# Patient Record
Sex: MALE | Race: BLACK OR AFRICAN AMERICAN | NOT HISPANIC OR LATINO | Employment: FULL TIME | URBAN - METROPOLITAN AREA
[De-identification: names, ages, dates, MRNs, and addresses within clinical notes are randomized per-mention and may not be internally consistent; named-entity substitution may affect disease eponyms.]

---

## 2023-11-16 ENCOUNTER — TELEPHONE (OUTPATIENT)
Dept: PRIMARY CARE | Facility: CLINIC | Age: 58
End: 2023-11-16
Payer: COMMERCIAL

## 2023-11-16 NOTE — TELEPHONE ENCOUNTER
DR. Null, Pt. Left a voicemail message saying need a referral from you it's very important call him back at [phone].

## 2023-11-17 ENCOUNTER — OFFICE VISIT (OUTPATIENT)
Dept: PRIMARY CARE | Facility: CLINIC | Age: 58
End: 2023-11-17
Payer: COMMERCIAL

## 2023-11-17 VITALS
SYSTOLIC BLOOD PRESSURE: 120 MMHG | WEIGHT: 166 LBS | HEART RATE: 81 BPM | DIASTOLIC BLOOD PRESSURE: 70 MMHG | TEMPERATURE: 97.8 F | BODY MASS INDEX: 25.24 KG/M2

## 2023-11-17 DIAGNOSIS — L03.011 INFECTION OF NAIL BED OF FINGER OF RIGHT HAND: ICD-10-CM

## 2023-11-17 DIAGNOSIS — B35.3 TINEA PEDIS OF BOTH FEET: ICD-10-CM

## 2023-11-17 DIAGNOSIS — H35.9 RETINA DISORDER, RIGHT: ICD-10-CM

## 2023-11-17 DIAGNOSIS — Z12.11 COLON CANCER SCREENING: ICD-10-CM

## 2023-11-17 DIAGNOSIS — E61.1 IRON DEFICIENCY: ICD-10-CM

## 2023-11-17 DIAGNOSIS — D75.89 MACROCYTOSIS: ICD-10-CM

## 2023-11-17 DIAGNOSIS — B35.1 ONYCHOMYCOSIS: Primary | ICD-10-CM

## 2023-11-17 DIAGNOSIS — E11.9 TYPE 2 DIABETES MELLITUS WITHOUT COMPLICATION, WITHOUT LONG-TERM CURRENT USE OF INSULIN (MULTI): ICD-10-CM

## 2023-11-17 DIAGNOSIS — E55.9 VITAMIN D DEFICIENCY: ICD-10-CM

## 2023-11-17 DIAGNOSIS — E78.00 ELEVATED LDL CHOLESTEROL LEVEL: ICD-10-CM

## 2023-11-17 PROBLEM — H25.13 AGE-RELATED NUCLEAR CATARACT, BILATERAL: Status: ACTIVE | Noted: 2023-11-17

## 2023-11-17 PROBLEM — K64.4 EXTERNAL HEMORRHOIDS: Status: ACTIVE | Noted: 2023-11-17

## 2023-11-17 PROBLEM — A60.00 HERPES GENITALIA: Status: ACTIVE | Noted: 2023-11-17

## 2023-11-17 PROBLEM — B97.7 HPV IN MALE: Status: ACTIVE | Noted: 2023-11-17

## 2023-11-17 PROBLEM — A63.0 CONDYLOMA ACUMINATUM: Status: ACTIVE | Noted: 2023-11-17

## 2023-11-17 PROBLEM — I25.10 CORONARY ARTERY DISEASE: Status: ACTIVE | Noted: 2023-11-17

## 2023-11-17 PROBLEM — H61.23 BILATERAL IMPACTED CERUMEN: Status: ACTIVE | Noted: 2019-11-02

## 2023-11-17 PROBLEM — L60.1 ONYCHOLYSIS OF TOENAIL: Status: ACTIVE | Noted: 2023-11-17

## 2023-11-17 PROBLEM — H60.549 ECZEMA OF EXTERNAL AUDITORY CANAL: Status: ACTIVE | Noted: 2019-11-02

## 2023-11-17 PROCEDURE — 3074F SYST BP LT 130 MM HG: CPT | Performed by: PEDIATRICS

## 2023-11-17 PROCEDURE — 99214 OFFICE O/P EST MOD 30 MIN: CPT | Performed by: PEDIATRICS

## 2023-11-17 PROCEDURE — 87070 CULTURE OTHR SPECIMN AEROBIC: CPT

## 2023-11-17 PROCEDURE — 87181 SC STD AGAR DILUTION PER AGT: CPT

## 2023-11-17 PROCEDURE — 87186 SC STD MICRODIL/AGAR DIL: CPT

## 2023-11-17 PROCEDURE — 3078F DIAST BP <80 MM HG: CPT | Performed by: PEDIATRICS

## 2023-11-17 PROCEDURE — 90471 IMMUNIZATION ADMIN: CPT | Performed by: PEDIATRICS

## 2023-11-17 PROCEDURE — 90686 IIV4 VACC NO PRSV 0.5 ML IM: CPT | Performed by: PEDIATRICS

## 2023-11-17 PROCEDURE — 87102 FUNGUS ISOLATION CULTURE: CPT

## 2023-11-17 PROCEDURE — 87075 CULTR BACTERIA EXCEPT BLOOD: CPT

## 2023-11-17 RX ORDER — GLUCOSAMINE/CHONDR SU A SOD 750-600 MG
1 TABLET ORAL DAILY
COMMUNITY
Start: 2021-08-04

## 2023-11-17 RX ORDER — ROSUVASTATIN CALCIUM 5 MG/1
5 TABLET, COATED ORAL DAILY
COMMUNITY
End: 2024-03-12 | Stop reason: ALTCHOICE

## 2023-11-17 RX ORDER — ACETAMINOPHEN 500 MG
2000 TABLET ORAL DAILY
COMMUNITY
Start: 2021-08-04

## 2023-11-17 NOTE — PROGRESS NOTES
Subjective   Patient ID: Sergio Hancock is a 58 y.o. male who presents for nail issue    HPI   Fungal infection right thumb and right middle finger. Failed Terbinafine previously April 2022.   Developed green discoloration under right thumb and middle finger; nail is starting to peel off the nailbed.  Needs cologuard  No blood work since Dec 2022    Review of Systems    Objective   /70   Pulse 81   Temp 36.6 °C (97.8 °F)   Wt 75.3 kg (166 lb)   BMI 25.24 kg/m²     Physical Exam  Lungs clear  Heart RRR  Right middle fingernail and thumbnail are discolored and lifting off nailbed  T pedis note  Assessment/Plan   Problem List Items Addressed This Visit             ICD-10-CM    Onychomycosis - Primary B35.1     Failed Terbinafine         Relevant Orders    Referral to Dermatology    Fungus Culture, Hair/Skin/Nails    Diabetes mellitus, type 2 (CMS/HCC) E11.9    Relevant Orders    Referral to Ophthalmology    Hemoglobin A1C    Elevated LDL cholesterol level E78.00    Relevant Orders    Comprehensive Metabolic Panel    Lipid panel    Iron deficiency E61.1    Relevant Orders    Iron and TIBC    Macrocytosis D75.89    Relevant Orders    CBC    Vitamin B12    Folate    Vitamin D deficiency E55.9    Retina disorder, 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

## 2023-11-18 PROBLEM — B35.3 TINEA PEDIS OF BOTH FEET: Status: ACTIVE | Noted: 2023-11-18

## 2023-11-22 LAB
BACTERIA SPEC CULT: ABNORMAL
GRAM STN SPEC: ABNORMAL
GRAM STN SPEC: ABNORMAL

## 2023-11-27 ENCOUNTER — APPOINTMENT (OUTPATIENT)
Dept: DERMATOLOGY | Facility: CLINIC | Age: 58
End: 2023-11-27
Payer: COMMERCIAL

## 2023-11-28 DIAGNOSIS — L60.8 GREEN NAILS: Primary | ICD-10-CM

## 2023-12-07 ENCOUNTER — LAB (OUTPATIENT)
Dept: LAB | Facility: LAB | Age: 58
End: 2023-12-07
Payer: COMMERCIAL

## 2023-12-07 ENCOUNTER — OFFICE VISIT (OUTPATIENT)
Dept: DERMATOLOGY | Facility: CLINIC | Age: 58
End: 2023-12-07
Payer: COMMERCIAL

## 2023-12-07 DIAGNOSIS — E78.00 ELEVATED LDL CHOLESTEROL LEVEL: ICD-10-CM

## 2023-12-07 DIAGNOSIS — E11.9 TYPE 2 DIABETES MELLITUS WITHOUT COMPLICATION, WITHOUT LONG-TERM CURRENT USE OF INSULIN (MULTI): ICD-10-CM

## 2023-12-07 DIAGNOSIS — L60.3 NAIL DYSTROPHY: Primary | ICD-10-CM

## 2023-12-07 DIAGNOSIS — L81.9 HYPERPIGMENTATION OF SKIN: ICD-10-CM

## 2023-12-07 DIAGNOSIS — E61.1 IRON DEFICIENCY: ICD-10-CM

## 2023-12-07 DIAGNOSIS — D75.89 MACROCYTOSIS: ICD-10-CM

## 2023-12-07 LAB
EST. AVERAGE GLUCOSE BLD GHB EST-MCNC: 160 MG/DL
HBA1C MFR BLD: 7.2 %

## 2023-12-07 PROCEDURE — 82746 ASSAY OF FOLIC ACID SERUM: CPT

## 2023-12-07 PROCEDURE — 80061 LIPID PANEL: CPT

## 2023-12-07 PROCEDURE — 82607 VITAMIN B-12: CPT

## 2023-12-07 PROCEDURE — 83550 IRON BINDING TEST: CPT

## 2023-12-07 PROCEDURE — 99203 OFFICE O/P NEW LOW 30 MIN: CPT | Performed by: DERMATOLOGY

## 2023-12-07 PROCEDURE — 88305 TISSUE EXAM BY PATHOLOGIST: CPT | Performed by: DERMATOLOGY

## 2023-12-07 PROCEDURE — 85027 COMPLETE CBC AUTOMATED: CPT

## 2023-12-07 PROCEDURE — 36415 COLL VENOUS BLD VENIPUNCTURE: CPT

## 2023-12-07 PROCEDURE — 80053 COMPREHEN METABOLIC PANEL: CPT

## 2023-12-07 PROCEDURE — 83036 HEMOGLOBIN GLYCOSYLATED A1C: CPT

## 2023-12-07 PROCEDURE — 83540 ASSAY OF IRON: CPT

## 2023-12-07 PROCEDURE — 88312 SPECIAL STAINS GROUP 1: CPT | Performed by: DERMATOLOGY

## 2023-12-07 PROCEDURE — 88312 SPECIAL STAINS GROUP 1: CPT | Mod: TC,DER | Performed by: DERMATOLOGY

## 2023-12-07 NOTE — PROGRESS NOTES
Ehsan Hancock is a 58 y.o. male who presents for the following: Nail Problem (Right thumb, middle finger, left big toe ). It was green.  His hands are not wet a lot. Took lamisil Nov 2021.    Review of Systems:  No other skin or systemic complaints other than what is documented elsewhere in the note.    The following portions of the chart were reviewed this encounter and updated as appropriate:          Skin Cancer History  No skin cancer on file.      Specialty Problems          Dermatology Problems    Onycholysis of toenail    Onychomycosis    Tinea pedis of both feet    Green nails        Objective   Well appearing patient in no apparent distress; mood and affect are within normal limits.    A focused skin examination was performed. All findings within normal limits unless otherwise noted below.    Assessment/Plan   1. Nail dystrophy (3)  Left Hallux Toe Nail Plate, Right 3rd Finger Nail Plate, Right Thumb Nail Plate  Onycholysis of all nails most of thumb.  Clipping sent for PAS/H&E.  If fungal discussed oral versus topical (he was treated with oral).  Culture showed pseudomonas. If fungal culture negative, discussed onycholysis treatment with clipping nail as far back as he can and then will prescribe topical 15% sodium sulfacetamide in 50% ethanol that has to be compounded (discussed Everardo Barber).      Skin biopsy - Right Thumb Nail Plate    Informed consent: discussed and consent obtained    Timeout: patient name, date of birth, surgical site, and procedure verified      Specimen 1 - Dermatopathology- DERM LAB  Differential Diagnosis: Onychomycosis  Check Margins Yes/No?:    Comments:    Dermpath Lab: Routine Histopathology (formalin-fixed tissue)    2. Hyperpigmentation of skin  Right Anterior Neck  Mild oval hyperpigmented patch right neck.  Could be postinflammatory hyperpigmentation.        2. Darkening after a rash might be made better with a bleaching cream.  The over the counter products  are lower strength than prescription, but less expensive. Do not use more than a month as you can get darkening of the skin that is irreversible.  Ambi fade cream is an example.

## 2023-12-08 LAB
ALBUMIN SERPL BCP-MCNC: 4.6 G/DL (ref 3.4–5)
ALP SERPL-CCNC: 76 U/L (ref 33–120)
ALT SERPL W P-5'-P-CCNC: 25 U/L (ref 10–52)
ANION GAP SERPL CALC-SCNC: 15 MMOL/L (ref 10–20)
AST SERPL W P-5'-P-CCNC: 17 U/L (ref 9–39)
BILIRUB SERPL-MCNC: 0.8 MG/DL (ref 0–1.2)
BUN SERPL-MCNC: 17 MG/DL (ref 6–23)
CALCIUM SERPL-MCNC: 9.5 MG/DL (ref 8.6–10.6)
CHLORIDE SERPL-SCNC: 102 MMOL/L (ref 98–107)
CHOLEST SERPL-MCNC: 149 MG/DL (ref 0–199)
CHOLESTEROL/HDL RATIO: 2.7
CO2 SERPL-SCNC: 27 MMOL/L (ref 21–32)
CREAT SERPL-MCNC: 1.02 MG/DL (ref 0.5–1.3)
ERYTHROCYTE [DISTWIDTH] IN BLOOD BY AUTOMATED COUNT: 11 % (ref 11.5–14.5)
FOLATE SERPL-MCNC: 23.7 NG/ML
GFR SERPL CREATININE-BSD FRML MDRD: 85 ML/MIN/1.73M*2
GLUCOSE SERPL-MCNC: 180 MG/DL (ref 74–99)
HCT VFR BLD AUTO: 48.4 % (ref 41–52)
HDLC SERPL-MCNC: 54.9 MG/DL
HGB BLD-MCNC: 16.4 G/DL (ref 13.5–17.5)
IRON SATN MFR SERPL: 39 % (ref 25–45)
IRON SERPL-MCNC: 135 UG/DL (ref 35–150)
LDLC SERPL CALC-MCNC: 80 MG/DL
MCH RBC QN AUTO: 33.9 PG (ref 26–34)
MCHC RBC AUTO-ENTMCNC: 33.9 G/DL (ref 32–36)
MCV RBC AUTO: 100 FL (ref 80–100)
NON HDL CHOLESTEROL: 94 MG/DL (ref 0–149)
NRBC BLD-RTO: 0 /100 WBCS (ref 0–0)
PLATELET # BLD AUTO: 223 X10*3/UL (ref 150–450)
POTASSIUM SERPL-SCNC: 5.8 MMOL/L (ref 3.5–5.3)
PROT SERPL-MCNC: 7.7 G/DL (ref 6.4–8.2)
RBC # BLD AUTO: 4.84 X10*6/UL (ref 4.5–5.9)
SODIUM SERPL-SCNC: 138 MMOL/L (ref 136–145)
TIBC SERPL-MCNC: 344 UG/DL (ref 240–445)
TRIGL SERPL-MCNC: 69 MG/DL (ref 0–149)
UIBC SERPL-MCNC: 209 UG/DL (ref 110–370)
VIT B12 SERPL-MCNC: 605 PG/ML (ref 211–911)
VLDL: 14 MG/DL (ref 0–40)
WBC # BLD AUTO: 8.4 X10*3/UL (ref 4.4–11.3)

## 2023-12-09 DIAGNOSIS — E87.5 HYPERKALEMIA: Primary | ICD-10-CM

## 2023-12-09 DIAGNOSIS — E11.9 TYPE 2 DIABETES MELLITUS WITHOUT COMPLICATION, WITHOUT LONG-TERM CURRENT USE OF INSULIN (MULTI): ICD-10-CM

## 2023-12-09 RX ORDER — METFORMIN HYDROCHLORIDE 500 MG/1
500 TABLET, EXTENDED RELEASE ORAL
Qty: 30 TABLET | Refills: 11 | Status: SHIPPED | OUTPATIENT
Start: 2023-12-09 | End: 2024-03-12 | Stop reason: ALTCHOICE

## 2023-12-09 RX ORDER — BLOOD-GLUCOSE SENSOR
EACH MISCELLANEOUS
Qty: 6 EACH | Refills: 0 | Status: SHIPPED | OUTPATIENT
Start: 2023-12-09 | End: 2024-03-12 | Stop reason: ALTCHOICE

## 2023-12-11 LAB — FUNGUS SPEC CULT: NORMAL

## 2023-12-12 ENCOUNTER — TELEPHONE (OUTPATIENT)
Dept: LAB | Facility: HOSPITAL | Age: 58
End: 2023-12-12

## 2023-12-12 LAB
LABORATORY COMMENT REPORT: NORMAL
PATH REPORT.FINAL DX SPEC: NORMAL
PATH REPORT.GROSS SPEC: NORMAL
PATH REPORT.MICROSCOPIC SPEC OTHER STN: NORMAL
PATH REPORT.RELEVANT HX SPEC: NORMAL
PATH REPORT.TOTAL CANCER: NORMAL

## 2023-12-12 NOTE — RESULT ENCOUNTER NOTE
I spoke with the patient about his results.  I discussed his results with him.  I recommended he clip his nail back as far as is comfortable.  For his onycholysis we want to dry the area out so bacteria and moisture don't have a place to set up.  Kiki, please call in a prescription for 3 fluid ounces of 15% sodium sulfacetamide in 50% ethanol/50% water into [facility] pharmacy 1 refill use twice a day topically to his nail.  If it is too expensive he will use an alcohol pad or place rubbing alcohol there twice a day to dry the area out.

## 2023-12-13 ENCOUNTER — OFFICE VISIT (OUTPATIENT)
Dept: OPHTHALMOLOGY | Facility: CLINIC | Age: 58
End: 2023-12-13
Payer: COMMERCIAL

## 2023-12-13 ENCOUNTER — LAB (OUTPATIENT)
Dept: LAB | Facility: LAB | Age: 58
End: 2023-12-13
Payer: COMMERCIAL

## 2023-12-13 DIAGNOSIS — E11.9 TYPE 2 DIABETES MELLITUS WITHOUT COMPLICATION, WITHOUT LONG-TERM CURRENT USE OF INSULIN (MULTI): Primary | ICD-10-CM

## 2023-12-13 DIAGNOSIS — E87.5 HYPERKALEMIA: ICD-10-CM

## 2023-12-13 DIAGNOSIS — H35.9 RETINA DISORDER, RIGHT: ICD-10-CM

## 2023-12-13 LAB — POTASSIUM SERPL-SCNC: 4.2 MMOL/L (ref 3.5–5.3)

## 2023-12-13 PROCEDURE — 36415 COLL VENOUS BLD VENIPUNCTURE: CPT

## 2023-12-13 PROCEDURE — 84132 ASSAY OF SERUM POTASSIUM: CPT

## 2023-12-13 PROCEDURE — 92134 CPTRZ OPH DX IMG PST SGM RTA: CPT | Mod: BILATERAL PROCEDURE

## 2023-12-13 PROCEDURE — 92004 COMPRE OPH EXAM NEW PT 1/>: CPT

## 2023-12-13 ASSESSMENT — ENCOUNTER SYMPTOMS
EYES NEGATIVE: 0
CONSTITUTIONAL NEGATIVE: 0
NEUROLOGICAL NEGATIVE: 0
HEMATOLOGIC/LYMPHATIC NEGATIVE: 0
ALLERGIC/IMMUNOLOGIC NEGATIVE: 0
ENDOCRINE NEGATIVE: 0
PSYCHIATRIC NEGATIVE: 0
GASTROINTESTINAL NEGATIVE: 0
CARDIOVASCULAR NEGATIVE: 0
MUSCULOSKELETAL NEGATIVE: 0
RESPIRATORY NEGATIVE: 0

## 2023-12-13 ASSESSMENT — CONF VISUAL FIELD
OS_SUPERIOR_NASAL_RESTRICTION: 0
OD_INFERIOR_TEMPORAL_RESTRICTION: 0
OS_SUPERIOR_TEMPORAL_RESTRICTION: 0
OD_SUPERIOR_TEMPORAL_RESTRICTION: 0
OD_INFERIOR_NASAL_RESTRICTION: 0
OS_INFERIOR_NASAL_RESTRICTION: 0
OD_SUPERIOR_NASAL_RESTRICTION: 0
OD_NORMAL: 1
OS_INFERIOR_TEMPORAL_RESTRICTION: 0
OS_NORMAL: 1

## 2023-12-13 ASSESSMENT — TONOMETRY
IOP_METHOD: GOLDMANN APPLANATION
OS_IOP_MMHG: 18
OD_IOP_MMHG: 17

## 2023-12-13 ASSESSMENT — EXTERNAL EXAM - RIGHT EYE: OD_EXAM: NORMAL

## 2023-12-13 ASSESSMENT — VISUAL ACUITY
OD_SC: 20/40
OS_SC: 20/30
OS_SC+: +2
METHOD: SNELLEN - LINEAR
OD_SC+: +2

## 2023-12-13 ASSESSMENT — EXTERNAL EXAM - LEFT EYE: OS_EXAM: NORMAL

## 2023-12-13 ASSESSMENT — CUP TO DISC RATIO
OD_RATIO: .3
OS_RATIO: .3

## 2023-12-13 ASSESSMENT — SLIT LAMP EXAM - LIDS
COMMENTS: GOOD POSITION
COMMENTS: GOOD POSITION

## 2023-12-13 NOTE — PROGRESS NOTES
Diabetes, No retinopathy, both eyesE11.9  - HgbA1c 7.2% (12/7/23)   - Diagnosed <5 years ago  - S/p mitral valve surgery due to mitral valve prolapse/regurgitation   - (-) HTN, (-) kidney disease     - OCT:  Right eye (OD): Normal foveal contour, RPE, outer and inner retinal layers. No IRF or SRF  Left eye (OS): Normal foveal contour, RPE  outer and inner retinal layers. No IRF or SRF    #Plan#:  - Stressed on importance of blood sugar and BP control  - Observe  - RTC in optometry in 1 year for refraction and DFE per patient preference

## 2023-12-28 LAB — NONINV COLON CA DNA+OCC BLD SCRN STL QL: NEGATIVE

## 2023-12-29 ENCOUNTER — APPOINTMENT (OUTPATIENT)
Dept: OPHTHALMOLOGY | Facility: CLINIC | Age: 58
End: 2023-12-29
Payer: COMMERCIAL

## 2024-01-13 ENCOUNTER — OFFICE VISIT (OUTPATIENT)
Dept: OTOLARYNGOLOGY | Facility: CLINIC | Age: 59
End: 2024-01-13
Payer: COMMERCIAL

## 2024-01-13 VITALS — BODY MASS INDEX: 25.16 KG/M2 | WEIGHT: 166 LBS | HEIGHT: 68 IN

## 2024-01-13 DIAGNOSIS — H61.23 BILATERAL IMPACTED CERUMEN: ICD-10-CM

## 2024-01-13 DIAGNOSIS — H60.543 ECZEMA OF EXTERNAL EAR, BILATERAL: Primary | ICD-10-CM

## 2024-01-13 PROCEDURE — 1036F TOBACCO NON-USER: CPT | Performed by: OTOLARYNGOLOGY

## 2024-01-13 PROCEDURE — 99213 OFFICE O/P EST LOW 20 MIN: CPT | Performed by: OTOLARYNGOLOGY

## 2024-01-13 PROCEDURE — 69210 REMOVE IMPACTED EAR WAX UNI: CPT | Performed by: OTOLARYNGOLOGY

## 2024-01-13 RX ORDER — FLUOCINOLONE ACETONIDE 0.11 MG/ML
OIL AURICULAR (OTIC)
Qty: 20 ML | Refills: 3 | Status: SHIPPED | OUTPATIENT
Start: 2024-01-13 | End: 2024-03-12 | Stop reason: ALTCHOICE

## 2024-01-13 NOTE — PROGRESS NOTES
Subjective   Patient ID: Sergio Hancock is a 58 y.o. male  HPI  Patient presents for chronic eczema of the external ear canals and recurrent cerumen impaction.  He continues to use the DermOtic drops.  He is complaining of congestion in his ears again.    Review of Systems    Objective   Physical Exam  There is cerumen impaction bilaterally and this was cleared using speculum and curettes.  The tympanic membranes are clear and mobile.  There is dry flaking of the skin in the ear canals consistent with eczema and this was also debrided.    Ear cerumen removal    Date/Time: 1/13/2024 11:49 AM    Performed by: Ellen Gonzalez MD  Authorized by: Ellen Gonzalez MD    Consent:     Consent obtained:  Verbal    Risks discussed:  Pain  Procedure details:     Location:  L ear and R ear    Procedure type: curette        Assessment/Plan   Diagnoses and all orders for this visit:  Eczema of external ear, bilateral (Primary)  -     fluocinolone (DermOtic) 0.01 % ear drops; Instill 5 drops in the affected ear once a day as needed for itching  Bilateral impacted cerumen  -     Ear cerumen removal     Chronic history of severe recurrent cerumen impaction with chronic severe eczema of the ear canals requiring periodic cleaning and debridement. The Dermotic drops were renewed and he will follow-up as needed.

## 2024-03-12 ENCOUNTER — HOSPITAL ENCOUNTER (OUTPATIENT)
Dept: CARDIOLOGY | Facility: CLINIC | Age: 59
Discharge: HOME | End: 2024-03-12
Payer: COMMERCIAL

## 2024-03-12 ENCOUNTER — OFFICE VISIT (OUTPATIENT)
Dept: CARDIOLOGY | Facility: CLINIC | Age: 59
End: 2024-03-12
Payer: COMMERCIAL

## 2024-03-12 VITALS
RESPIRATION RATE: 16 BRPM | HEART RATE: 55 BPM | BODY MASS INDEX: 25.01 KG/M2 | OXYGEN SATURATION: 98 % | SYSTOLIC BLOOD PRESSURE: 134 MMHG | DIASTOLIC BLOOD PRESSURE: 74 MMHG | WEIGHT: 165 LBS | HEIGHT: 68 IN

## 2024-03-12 DIAGNOSIS — E78.00 ELEVATED LDL CHOLESTEROL LEVEL: Primary | ICD-10-CM

## 2024-03-12 DIAGNOSIS — I34.0 NONRHEUMATIC MITRAL (VALVE) INSUFFICIENCY: ICD-10-CM

## 2024-03-12 DIAGNOSIS — I34.0 NONRHEUMATIC MITRAL VALVE REGURGITATION: ICD-10-CM

## 2024-03-12 DIAGNOSIS — R06.02 SHORTNESS OF BREATH: ICD-10-CM

## 2024-03-12 DIAGNOSIS — I25.10 CORONARY ARTERY DISEASE INVOLVING NATIVE CORONARY ARTERY OF NATIVE HEART WITHOUT ANGINA PECTORIS: ICD-10-CM

## 2024-03-12 DIAGNOSIS — Z86.79 HISTORY OF MITRAL VALVE INSUFFICIENCY: ICD-10-CM

## 2024-03-12 LAB
AORTIC VALVE PEAK VELOCITY: 0.96 M/S
AV PEAK GRADIENT: 3.7 MMHG
AVA (PEAK VEL): 3.84 CM2
EJECTION FRACTION APICAL 4 CHAMBER: 45.2
EJECTION FRACTION: 41 %
LEFT VENTRICLE INTERNAL DIMENSION DIASTOLE: 5.24 CM (ref 3.5–6)
LEFT VENTRICULAR OUTFLOW TRACT DIAMETER: 2.3 CM
MITRAL VALVE E/A RATIO: 1.14
MITRAL VALVE E/E' RATIO: 13.52
RIGHT VENTRICLE FREE WALL PEAK S': 6.86 CM/S
RIGHT VENTRICLE PEAK SYSTOLIC PRESSURE: 17.6 MMHG
TRICUSPID ANNULAR PLANE SYSTOLIC EXCURSION: 1.5 CM

## 2024-03-12 PROCEDURE — 3078F DIAST BP <80 MM HG: CPT | Performed by: INTERNAL MEDICINE

## 2024-03-12 PROCEDURE — 3075F SYST BP GE 130 - 139MM HG: CPT | Performed by: INTERNAL MEDICINE

## 2024-03-12 PROCEDURE — 93306 TTE W/DOPPLER COMPLETE: CPT | Performed by: INTERNAL MEDICINE

## 2024-03-12 PROCEDURE — 1036F TOBACCO NON-USER: CPT | Performed by: INTERNAL MEDICINE

## 2024-03-12 PROCEDURE — 93306 TTE W/DOPPLER COMPLETE: CPT

## 2024-03-12 PROCEDURE — 99214 OFFICE O/P EST MOD 30 MIN: CPT | Performed by: INTERNAL MEDICINE

## 2024-03-12 RX ORDER — ROSUVASTATIN CALCIUM 10 MG/1
10 TABLET, COATED ORAL DAILY
Qty: 90 TABLET | Refills: 3 | Status: SHIPPED | OUTPATIENT
Start: 2024-03-12

## 2024-03-12 ASSESSMENT — PAIN SCALES - GENERAL: PAINLEVEL: 0-NO PAIN

## 2024-03-12 NOTE — PROGRESS NOTES
Subjective   Cyndy Herr is a 58 y.o. male.    Chief Complaint:  CYNDY HERR is being seen for a six month follow-up of coronary artery disease, dyspnea, dyslipidemia, valvular problems, MVR and a routine medication evaluation.     HPI  This is a 59 y/o male here today for a six month Cardiology follow up visit. He had a Mitral valve repair in August 2020- see OP note. Reviewed lab test results and current medications. His CT Cardiac Calcium score was 26 in 2020. He denies chest pain, sob, heart palpitations, or lower leg edema. An ECHO was done earlier today, reviewed results- see report. He will start taking Metformin for Diabetes.    Objective   Physical Exam    Lab Review:   Lab on 12/13/2023   Component Date Value    Potassium 12/13/2023 4.2    Lab on 12/07/2023   Component Date Value    WBC 12/07/2023 8.4     nRBC 12/07/2023 0.0     RBC 12/07/2023 4.84     Hemoglobin 12/07/2023 16.4     Hematocrit 12/07/2023 48.4     MCV 12/07/2023 100     MCH 12/07/2023 33.9     MCHC 12/07/2023 33.9     RDW 12/07/2023 11.0 (L)     Platelets 12/07/2023 223     Glucose 12/07/2023 180 (H)     Sodium 12/07/2023 138     Potassium 12/07/2023 5.8 (H)     Chloride 12/07/2023 102     Bicarbonate 12/07/2023 27     Anion Gap 12/07/2023 15     Urea Nitrogen 12/07/2023 17     Creatinine 12/07/2023 1.02     eGFR 12/07/2023 85     Calcium 12/07/2023 9.5     Albumin 12/07/2023 4.6     Alkaline Phosphatase 12/07/2023 76     Total Protein 12/07/2023 7.7     AST 12/07/2023 17     Bilirubin, Total 12/07/2023 0.8     ALT 12/07/2023 25     Hemoglobin A1C 12/07/2023 7.2 (H)     Estimated Average Glucose 12/07/2023 160     Vitamin B12 12/07/2023 605     Folate, Serum 12/07/2023 23.7     Cholesterol 12/07/2023 149     HDL-Cholesterol 12/07/2023 54.9     Cholesterol/HDL Ratio 12/07/2023 2.7     LDL Calculated 12/07/2023 80     VLDL 12/07/2023 14     Triglycerides 12/07/2023 69     Non HDL Cholesterol 12/07/2023 94     Iron 12/07/2023 135     UIBC  12/07/2023 209     TIBC 12/07/2023 344     % Saturation 12/07/2023 39    Office Visit on 12/07/2023   Component Date Value    Case Report 12/07/2023                      Value:Dermatopathology                                  Case: [id number]                                   Authorizing Provider:  Veto Conteh MD           Collected:           12/07/2023 1450              Ordering Location:     [facility]       Received:            12/07/2023 [address] [place] [address]                                                               Pathologist:           Sirena Mckoy MD                                                             Specimen:    SKIN BIOPSY, Right Thumb Nail Plate                                                        FINAL DIAGNOSIS 12/07/2023                      Value:This result contains rich text formatting which cannot be displayed here.      12/07/2023                      Value:This result contains rich text formatting which cannot be displayed here.    Clinical History 12/07/2023                      Value:This result contains rich text formatting which cannot be displayed here.    Microscopic Description 12/07/2023                      Value:This result contains rich text formatting which cannot be displayed here.    Gross Description 12/07/2023                      Value:This result contains rich text formatting which cannot be displayed here.   Office Visit on 11/17/2023   Component Date Value    Fungal Culture/Smear 11/17/2023 No fungi isolated.     Tissue/Wound Culture/Sme* 11/17/2023 (3+) Moderate Pseudomonas aeruginosa (A)     Tissue/Wound Culture/Sme* 11/17/2023 (4+) Abundant Mixed Gram-Positive and Gram-Negative Bacteria     Tissue/Wound Culture/Sme* 11/17/2023 (1+) Rare Bacteroides fragilis (A)     Gram Stain 11/17/2023 No polymorphonuclear leukocytes seen (A)     Gram Stain 11/17/2023 (2+) Few Mixed Gram positive and Gram negative bacteria (A)      NONINV COLON CA DNA+OCC * 12/20/2023 Negative        Assessment/Plan   1. Status post radical mitral valve repair with #33 ATS ring plus posterior leaflet artificial chordae tendineae 8/26/2020 [facility]. The patient is doing very well now approximately 1-1/2 years since his operative procedure. Echocardiogram performed today demonstrates a normal mitral valve repair with no insufficiency and an estimated LV ejection fraction of 60%. There is an artificial cord noted to the posterior mitral valve leaflet. The patient is on no cardiac medications and has had no complaints of shortness of breath chest pain or palpitations. EKG tracings in the past have demonstrated sinus rhythm with voltage criteria for left ventricular hypertrophy. His blood pressure today is well within normal range in the absence of treatment. The patient is doing cardio workouts with treadmill 25 minutes elliptical 30 minutes 3 times per week and was encouraged to proceed with this program. Prior to his operative procedure the patient had a CT angiogram of the coronary arteries which demonstrated no significant disease with perhaps a mild small calcific plaque in the proximal LAD causing a 30% stenosis. His CT coronary calcium score was only 26. As such he did not have a cardiac catheterization prior to the operative procedure. The patient will return in 9months for routine follow-up visit.  Patient is doing well from cardiac standpoint.  Echocardiogram performed today demonstrates a mildly reduced LV ejection fraction 45% with abnormal septal motion due to prior thoracotomy.  He does have mild left atrial enlargement.  The mitral valve repair appears to be intact without artificial chordae tendineae to the posterior mitral leaflet.  There is only a trace amount of mitral valve regurgitation.     2. Low-grade coronary artery disease. The patient had a preoperative CT coronary calcium score of only 26 on 8/11/2020 with CT angiogram of  the coronary arteries showing minimal disease with a less than or equal to 30% plaque in the proximal LAD.     3.  Hyperlipidemia.  Given the patient's elevated CT coronary calcium score he had been started on rosuvastatin 5 mg daily.  Lab work 12/7/2023 includes cholesterol 149 LDL 80 HDL 54 triglycerides 69.  Will increase dose of rosuvastatin from 5 to 10 mg daily.    4.  Type 2 diabetes.  This patient is glycohemoglobin has been gradually rising over the past several years and was 7.2% on 12/7/2023.  The patient has been started on metformin 500 mg daily.  Additional lab work from 12/7/2023 included a normal SMA panel except glucose 180 and potassium of 5.8.  The potassium was repeated at 4.2.  Vitamin B12 605 folate 23.7 serum iron 135 normal CBC.

## 2024-03-18 ENCOUNTER — OFFICE VISIT (OUTPATIENT)
Dept: OPHTHALMOLOGY | Facility: CLINIC | Age: 59
End: 2024-03-18
Payer: COMMERCIAL

## 2024-03-18 DIAGNOSIS — H25.13 AGE-RELATED NUCLEAR CATARACT, BILATERAL: Primary | ICD-10-CM

## 2024-03-18 DIAGNOSIS — H52.4 REGULAR ASTIGMATISM OF BOTH EYES WITH PRESBYOPIA: ICD-10-CM

## 2024-03-18 DIAGNOSIS — E11.9 TYPE 2 DIABETES MELLITUS WITHOUT RETINOPATHY (MULTI): ICD-10-CM

## 2024-03-18 DIAGNOSIS — H52.223 REGULAR ASTIGMATISM OF BOTH EYES WITH PRESBYOPIA: ICD-10-CM

## 2024-03-18 PROCEDURE — 92015 DETERMINE REFRACTIVE STATE: CPT | Performed by: STUDENT IN AN ORGANIZED HEALTH CARE EDUCATION/TRAINING PROGRAM

## 2024-03-18 PROCEDURE — 92012 INTRM OPH EXAM EST PATIENT: CPT | Performed by: STUDENT IN AN ORGANIZED HEALTH CARE EDUCATION/TRAINING PROGRAM

## 2024-03-18 ASSESSMENT — ENCOUNTER SYMPTOMS
HEMATOLOGIC/LYMPHATIC NEGATIVE: 0
RESPIRATORY NEGATIVE: 0
PSYCHIATRIC NEGATIVE: 0
ENDOCRINE NEGATIVE: 0
ALLERGIC/IMMUNOLOGIC NEGATIVE: 0
GASTROINTESTINAL NEGATIVE: 0
EYES NEGATIVE: 0
CONSTITUTIONAL NEGATIVE: 0
CARDIOVASCULAR NEGATIVE: 0
NEUROLOGICAL NEGATIVE: 0
MUSCULOSKELETAL NEGATIVE: 0

## 2024-03-18 ASSESSMENT — REFRACTION_MANIFEST
OS_ADD: +2.00
OD_CYLINDER: +0.75
OS_AXIS: 075
OD_SPHERE: -0.50
OS_SPHERE: -0.75
OS_CYLINDER: +0.75
OD_AXIS: 100
OD_ADD: +2.00

## 2024-03-18 ASSESSMENT — CONF VISUAL FIELD
OD_SUPERIOR_TEMPORAL_RESTRICTION: 0
OS_INFERIOR_NASAL_RESTRICTION: 0
OD_INFERIOR_NASAL_RESTRICTION: 0
METHOD: COUNTING FINGERS
OD_INFERIOR_TEMPORAL_RESTRICTION: 0
OS_INFERIOR_TEMPORAL_RESTRICTION: 0
OD_SUPERIOR_NASAL_RESTRICTION: 0
OS_SUPERIOR_TEMPORAL_RESTRICTION: 0
OS_NORMAL: 1
OD_NORMAL: 1
OS_SUPERIOR_NASAL_RESTRICTION: 0

## 2024-03-18 ASSESSMENT — VISUAL ACUITY
OS_SC: 20/30
METHOD: SNELLEN - LINEAR
OD_SC: 20/30
OD_PH_SC: 20/20
OS_PH_SC: 20/20

## 2024-03-18 ASSESSMENT — EXTERNAL EXAM - LEFT EYE: OS_EXAM: NORMAL

## 2024-03-18 ASSESSMENT — EXTERNAL EXAM - RIGHT EYE: OD_EXAM: NORMAL

## 2024-03-18 ASSESSMENT — TONOMETRY
OD_IOP_MMHG: 16
IOP_METHOD: GOLDMANN APPLANATION
OS_IOP_MMHG: 15

## 2024-03-18 ASSESSMENT — CUP TO DISC RATIO
OD_RATIO: .3
OS_RATIO: .3

## 2024-03-18 ASSESSMENT — SLIT LAMP EXAM - LIDS
COMMENTS: GOOD POSITION
COMMENTS: GOOD POSITION

## 2024-03-19 PROBLEM — H52.4 REGULAR ASTIGMATISM OF BOTH EYES WITH PRESBYOPIA: Status: ACTIVE | Noted: 2024-03-19

## 2024-03-19 PROBLEM — H52.223 REGULAR ASTIGMATISM OF BOTH EYES WITH PRESBYOPIA: Status: ACTIVE | Noted: 2024-03-19

## 2024-03-19 NOTE — PROGRESS NOTES
Assessment/Plan   Diagnoses and all orders for this visit:  Regular astigmatism of both eyes with presbyopia  -New spec rx released today per patient request. Ocular health wnl for age OU. Monitor 1 year or sooner prn. Refraction billed today. BCVA 20/20 OD+OS  Age-related nuclear cataract, bilateral  -mild non visually significant. Pt ed. Monitor  Type 2 diabetes mellitus without retinopathy (CMS/HCC)  -last DFE with retina 12/2023 within normal limits  -Pt ed to continue good BGlc, blood pressure and lipid control, rtc with any changes in vision, otherwise monitor 1 year    RTC 1 year for annual with DFE and MRX

## 2024-05-28 ENCOUNTER — OFFICE VISIT (OUTPATIENT)
Dept: PRIMARY CARE | Facility: CLINIC | Age: 59
End: 2024-05-28
Payer: COMMERCIAL

## 2024-05-28 VITALS
BODY MASS INDEX: 25.01 KG/M2 | WEIGHT: 165 LBS | RESPIRATION RATE: 16 BRPM | TEMPERATURE: 96.6 F | OXYGEN SATURATION: 96 % | DIASTOLIC BLOOD PRESSURE: 68 MMHG | HEIGHT: 68 IN | SYSTOLIC BLOOD PRESSURE: 114 MMHG | HEART RATE: 58 BPM

## 2024-05-28 DIAGNOSIS — Z12.5 SCREENING PSA (PROSTATE SPECIFIC ANTIGEN): ICD-10-CM

## 2024-05-28 DIAGNOSIS — E11.9 TYPE 2 DIABETES MELLITUS WITHOUT RETINOPATHY (MULTI): Primary | ICD-10-CM

## 2024-05-28 DIAGNOSIS — L60.8 GREEN NAILS: ICD-10-CM

## 2024-05-28 PROCEDURE — 83036 HEMOGLOBIN GLYCOSYLATED A1C: CPT | Performed by: PEDIATRICS

## 2024-05-28 PROCEDURE — 80048 BASIC METABOLIC PNL TOTAL CA: CPT | Performed by: PEDIATRICS

## 2024-05-28 PROCEDURE — 99214 OFFICE O/P EST MOD 30 MIN: CPT | Performed by: PEDIATRICS

## 2024-05-28 PROCEDURE — 84153 ASSAY OF PSA TOTAL: CPT | Performed by: PEDIATRICS

## 2024-05-28 PROCEDURE — 3078F DIAST BP <80 MM HG: CPT | Performed by: PEDIATRICS

## 2024-05-28 PROCEDURE — 1036F TOBACCO NON-USER: CPT | Performed by: PEDIATRICS

## 2024-05-28 PROCEDURE — 3074F SYST BP LT 130 MM HG: CPT | Performed by: PEDIATRICS

## 2024-05-28 RX ORDER — METFORMIN HYDROCHLORIDE 500 MG/1
500 TABLET, EXTENDED RELEASE ORAL
Qty: 100 TABLET | Refills: 0 | OUTPATIENT
Start: 2024-05-28 | End: 2025-07-02

## 2024-05-28 ASSESSMENT — PATIENT HEALTH QUESTIONNAIRE - PHQ9
2. FEELING DOWN, DEPRESSED OR HOPELESS: NOT AT ALL
1. LITTLE INTEREST OR PLEASURE IN DOING THINGS: NOT AT ALL
SUM OF ALL RESPONSES TO PHQ9 QUESTIONS 1 AND 2: 0

## 2024-05-28 ASSESSMENT — PAIN SCALES - GENERAL: PAINLEVEL: 0-NO PAIN

## 2024-05-28 NOTE — PROGRESS NOTES
"Subjective   Patient ID: Sergio Hancock is a 58 y.o. male who presents for Diabetes.    Diabetes       Cologuard neg  Chol 149 in December  DM--takes Metformin 3 times a week; does not eat as much sweets; does not want to check sugar  Review of Systems    Objective   /68 (BP Location: Left arm, Patient Position: Sitting, BP Cuff Size: Adult)   Pulse 58   Temp 35.9 °C (96.6 °F) (Temporal)   Resp 16   Ht 1.727 m (5' 8\")   Wt 74.8 kg (165 lb)   SpO2 96%   BMI 25.09 kg/m²     Physical Exam  Vitals reviewed.   Constitutional:       General: He is not in acute distress.  HENT:      Head: Normocephalic.      Right Ear: Tympanic membrane normal.      Left Ear: Tympanic membrane normal.      Nose: Nose normal.      Mouth/Throat:      Pharynx: Oropharynx is clear.   Cardiovascular:      Rate and Rhythm: Normal rate and regular rhythm.      Heart sounds: Normal heart sounds.   Pulmonary:      Breath sounds: Normal breath sounds.   Abdominal:      Palpations: Abdomen is soft.      Tenderness: There is no abdominal tenderness.   Musculoskeletal:         General: No tenderness.   Skin:     Findings: No rash.      Comments: Thumbnail green and lifting up   Neurological:      General: No focal deficit present.      Mental Status: He is alert.   Psychiatric:         Mood and Affect: Mood normal.         Assessment/Plan   Problem List Items Addressed This Visit             ICD-10-CM    Type 2 diabetes mellitus without retinopathy (Multi) - Primary E11.9    Relevant Medications    metFORMIN XR (Glucophage-XR) 500 mg 24 hr tablet    Other Relevant Orders    Hemoglobin A1C    Basic Metabolic Panel    Green nails L60.8     Other Visit Diagnoses         Codes    Screening PSA (prostate specific antigen)     Z12.5    Relevant Orders    Prostate Specific Antigen               "

## 2024-06-06 ENCOUNTER — TELEPHONE (OUTPATIENT)
Dept: PRIMARY CARE | Facility: CLINIC | Age: 59
End: 2024-06-06
Payer: COMMERCIAL

## 2024-06-06 NOTE — TELEPHONE ENCOUNTER
Informed Pt. Sergio Hancock of  Dr. Null message, Pt. verbalizes understanding of the message given.

## 2024-06-06 NOTE — TELEPHONE ENCOUNTER
----- Message from Belkis Null MD sent at 6/1/2024  8:14 AM EDT -----  PSA ok at 1.38; sugar a bit high at 138; however hemoglobin A1C of 6.8 indicates diabetes is in good control.

## 2024-07-20 ENCOUNTER — APPOINTMENT (OUTPATIENT)
Dept: OTOLARYNGOLOGY | Facility: CLINIC | Age: 59
End: 2024-07-20
Payer: COMMERCIAL

## 2024-08-03 ENCOUNTER — APPOINTMENT (OUTPATIENT)
Dept: OTOLARYNGOLOGY | Facility: CLINIC | Age: 59
End: 2024-08-03
Payer: COMMERCIAL

## 2024-08-03 VITALS — BODY MASS INDEX: 25.01 KG/M2 | HEIGHT: 68 IN | TEMPERATURE: 96.6 F | WEIGHT: 165 LBS

## 2024-08-03 DIAGNOSIS — H61.23 BILATERAL IMPACTED CERUMEN: ICD-10-CM

## 2024-08-03 DIAGNOSIS — H60.543 ECZEMA OF EXTERNAL EAR, BILATERAL: Primary | ICD-10-CM

## 2024-08-03 PROCEDURE — 3008F BODY MASS INDEX DOCD: CPT | Performed by: OTOLARYNGOLOGY

## 2024-08-03 PROCEDURE — 1036F TOBACCO NON-USER: CPT | Performed by: OTOLARYNGOLOGY

## 2024-08-03 PROCEDURE — 69210 REMOVE IMPACTED EAR WAX UNI: CPT | Performed by: OTOLARYNGOLOGY

## 2024-08-03 PROCEDURE — 99213 OFFICE O/P EST LOW 20 MIN: CPT | Performed by: OTOLARYNGOLOGY

## 2024-08-03 RX ORDER — FLUOCINOLONE ACETONIDE 0.11 MG/ML
OIL AURICULAR (OTIC)
Qty: 20 ML | Refills: 3 | Status: SHIPPED | OUTPATIENT
Start: 2024-08-03

## 2024-08-03 NOTE — PROGRESS NOTES
"Subjective   Patient ID: Sergio Hancock \"ALBERT\" is a 58 y.o. male  HPI  Patient presents for chronic eczema of the external ear canals and recurrent cerumen impaction.  He continues to use the DermOtic drops.  He is complaining of congestion in his ears again.    Review of Systems    Objective   Physical Exam  There is cerumen impaction bilaterally and this was cleared using speculum and curettes.  The tympanic membranes are clear and mobile.  There is dry flaking of the skin in the ear canals consistent with eczema and this was also debrided.    Ear cerumen removal    Date/Time: 8/3/2024 11:57 AM    Performed by: Ellen Gonzalez MD  Authorized by: Ellen Gonzalez MD    Consent:     Consent obtained:  Verbal    Risks discussed:  Pain  Procedure details:     Location:  L ear and R ear    Procedure type: curette        Assessment/Plan   Diagnoses and all orders for this visit:  Eczema of external ear, bilateral (Primary)  -     fluocinolone (DermOtic) 0.01 % ear drops; Instill 5 drops in the affected ear once a day as needed for itching  Bilateral impacted cerumen  -     Ear cerumen removal       Chronic history of severe recurrent cerumen impaction with chronic severe eczema of the ear canals requiring periodic cleaning and debridement. The Dermotic drops were renewed and he will follow-up as needed.  "

## 2024-09-06 PROBLEM — R20.2 PARESTHESIA OF UPPER EXTREMITY: Status: ACTIVE | Noted: 2024-09-06

## 2024-09-06 PROBLEM — H35.9 RETINAL DISORDER: Status: ACTIVE | Noted: 2019-05-01

## 2024-09-06 PROBLEM — I34.0 MITRAL VALVE INSUFFICIENCY: Status: ACTIVE | Noted: 2024-09-06

## 2024-09-06 PROBLEM — R79.9 ABNORMAL BLOOD CHEMISTRY LEVEL: Status: ACTIVE | Noted: 2024-09-06

## 2024-09-06 PROBLEM — A74.9 INFECTION DUE TO CHLAMYDIA SPECIES: Status: ACTIVE | Noted: 2024-09-06

## 2024-09-06 PROBLEM — H25.10 NUCLEAR SENILE CATARACT: Status: ACTIVE | Noted: 2019-05-01

## 2024-09-06 PROBLEM — H61.20 IMPACTED CERUMEN: Status: ACTIVE | Noted: 2024-09-06

## 2024-09-06 PROBLEM — R51.9 HEADACHE: Status: ACTIVE | Noted: 2024-09-06

## 2024-09-06 PROBLEM — K62.5 HEMORRHAGE OF RECTUM AND ANUS: Status: ACTIVE | Noted: 2024-09-06

## 2024-09-06 PROBLEM — E11.9 TYPE 2 DIABETES MELLITUS (MULTI): Status: ACTIVE | Noted: 2024-09-06

## 2024-09-11 ENCOUNTER — OFFICE VISIT (OUTPATIENT)
Dept: CARDIOLOGY | Facility: CLINIC | Age: 59
End: 2024-09-11
Payer: COMMERCIAL

## 2024-09-11 VITALS
SYSTOLIC BLOOD PRESSURE: 134 MMHG | WEIGHT: 162.31 LBS | BODY MASS INDEX: 24.6 KG/M2 | OXYGEN SATURATION: 98 % | HEART RATE: 58 BPM | DIASTOLIC BLOOD PRESSURE: 83 MMHG | HEIGHT: 68 IN

## 2024-09-11 DIAGNOSIS — I34.0 MITRAL VALVE INSUFFICIENCY, UNSPECIFIED ETIOLOGY: Primary | ICD-10-CM

## 2024-09-11 PROCEDURE — 99214 OFFICE O/P EST MOD 30 MIN: CPT | Performed by: NURSE PRACTITIONER

## 2024-09-11 PROCEDURE — 3008F BODY MASS INDEX DOCD: CPT | Performed by: NURSE PRACTITIONER

## 2024-09-11 PROCEDURE — 3079F DIAST BP 80-89 MM HG: CPT | Performed by: NURSE PRACTITIONER

## 2024-09-11 PROCEDURE — 3075F SYST BP GE 130 - 139MM HG: CPT | Performed by: NURSE PRACTITIONER

## 2024-09-11 PROCEDURE — 1036F TOBACCO NON-USER: CPT | Performed by: NURSE PRACTITIONER

## 2024-09-11 ASSESSMENT — PATIENT HEALTH QUESTIONNAIRE - PHQ9
SUM OF ALL RESPONSES TO PHQ9 QUESTIONS 1 AND 2: 0
1. LITTLE INTEREST OR PLEASURE IN DOING THINGS: NOT AT ALL
2. FEELING DOWN, DEPRESSED OR HOPELESS: NOT AT ALL

## 2024-09-11 ASSESSMENT — ENCOUNTER SYMPTOMS
GASTROINTESTINAL NEGATIVE: 1
MUSCULOSKELETAL NEGATIVE: 1
DEPRESSION: 0
NEUROLOGICAL NEGATIVE: 1
RESPIRATORY NEGATIVE: 1
LOSS OF SENSATION IN FEET: 0
CONSTITUTIONAL NEGATIVE: 1
CARDIOVASCULAR NEGATIVE: 1
OCCASIONAL FEELINGS OF UNSTEADINESS: 0

## 2024-09-11 ASSESSMENT — COLUMBIA-SUICIDE SEVERITY RATING SCALE - C-SSRS
1. IN THE PAST MONTH, HAVE YOU WISHED YOU WERE DEAD OR WISHED YOU COULD GO TO SLEEP AND NOT WAKE UP?: NO
2. HAVE YOU ACTUALLY HAD ANY THOUGHTS OF KILLING YOURSELF?: NO
6. HAVE YOU EVER DONE ANYTHING, STARTED TO DO ANYTHING, OR PREPARED TO DO ANYTHING TO END YOUR LIFE?: NO

## 2024-09-11 NOTE — PROGRESS NOTES
"Chief Complaint:   Follow-up and Coronary Artery Disease    History Of Present Illness:    .Mr Card returns in follow up.  Denies chest pain, sob, palpitations or pedal edema.           Last Recorded Vitals:  Blood pressure 134/83, pulse 58, height 1.727 m (5' 8\"), weight 73.6 kg (162 lb 5 oz), SpO2 98%.     Past Medical History:  Past Medical History:   Diagnosis Date    Chlamydial infection, unspecified     Disease due to chlamydiae    Elevated blood-pressure reading, without diagnosis of hypertension 09/09/2020    Elevated blood pressure reading    Hemorrhage of anus and rectum     Hemorrhage of rectum and anus    Other conditions influencing health status     Verrucous Carcinoma Of The Anal Canal    Personal history of other diseases of the circulatory system 11/30/2022    History of mitral valve insufficiency    Personal history of other diseases of the circulatory system 12/03/2020    History of mitral valve prolapse    Personal history of other diseases of the circulatory system 11/30/2022    History of mitral valve disease    Personal history of other specified conditions 09/09/2020    History of abnormal weight loss    Personal history of other specified conditions 11/17/2020    History of headache        Past Surgical History:  Past Surgical History:   Procedure Laterality Date    OTHER SURGICAL HISTORY  09/24/2013    Abdominoperineal Resection    OTHER SURGICAL HISTORY  09/11/2020    Anal Surgery    OTHER SURGICAL HISTORY  10/23/2020    Mitral valve repair       Social History:  Social History     Socioeconomic History    Marital status: Single   Tobacco Use    Smoking status: Never    Smokeless tobacco: Never   Substance and Sexual Activity    Alcohol use: Never    Drug use: Never       Family History:  No family history on file.      Allergies:  Penicillins    Outpatient Medications:  Current Outpatient Medications   Medication Sig Dispense Refill    biotin 2,500 mcg capsule Take 1 capsule (2.5 mg) by " mouth once daily.      cholecalciferol (Vitamin D-3) 50 mcg (2,000 unit) capsule Take 1 capsule (50 mcg) by mouth once daily.      fluocinolone (DermOtic) 0.01 % ear drops Instill 5 drops in the affected ear once a day as needed for itching 20 mL 3    metFORMIN XR (Glucophage-XR) 500 mg 24 hr tablet Take 1 tablet (500 mg) by mouth once daily in the evening. Take with meals. Do not crush, chew, or split. 100 tablet 0    rosuvastatin (Crestor) 10 mg tablet Take 1 tablet (10 mg) by mouth once daily. 90 tablet 3     No current facility-administered medications for this visit.        Physical Exam:  Cardiovascular:      PMI at left midclavicular line. Normal rate. Regular rhythm. Normal S1. Normal S2.       Murmurs: There is no murmur.      No gallop.  No click. No rub.   Pulses:     Intact distal pulses.   Edema:     Peripheral edema absent.         ROS:  Review of Systems   Constitutional: Negative.   Cardiovascular: Negative.    Respiratory: Negative.     Skin: Negative.    Musculoskeletal: Negative.    Gastrointestinal: Negative.    Genitourinary: Negative.    Neurological: Negative.           Last Labs:  CBC -  Lab Results   Component Value Date    WBC 8.4 12/07/2023    HGB 16.4 12/07/2023    HCT 48.4 12/07/2023     12/07/2023     12/07/2023       CMP -  Lab Results   Component Value Date    CALCIUM 9.5 12/07/2023    PHOS 2.6 09/03/2020    PROT 7.7 12/07/2023    ALBUMIN 4.6 12/07/2023    AST 17 12/07/2023    ALT 25 12/07/2023    ALKPHOS 76 12/07/2023    BILITOT 0.8 12/07/2023       LIPID PANEL -   Lab Results   Component Value Date    CHOL 149 12/07/2023    TRIG 69 12/07/2023    HDL 54.9 12/07/2023    CHHDL 2.7 12/07/2023    LDLF 100 (H) 12/08/2022    VLDL 14 12/07/2023    NHDL 94 12/07/2023       RENAL FUNCTION PANEL -   Lab Results   Component Value Date    GLUCOSE 180 (H) 12/07/2023     12/07/2023    K 4.2 12/13/2023     12/07/2023    CO2 27 12/07/2023    ANIONGAP 15 12/07/2023    BUN 17  12/07/2023    CREATININE 1.02 12/07/2023    GFRMALE 89 12/08/2022    CALCIUM 9.5 12/07/2023    PHOS 2.6 09/03/2020    ALBUMIN 4.6 12/07/2023        Lab Results   Component Value Date    HGBA1C 7.2 (H) 12/07/2023         Assessment/Plan   Problem List Items Addressed This Visit    None      1. Status post radical mitral valve repair with #33 ATS ring plus posterior leaflet artificial chordae tendineae 8/26/2020 [facility] [place]. The patient is doing very well now approximately 1-1/2 years since his operative procedure. Echocardiogram performed today demonstrates a normal mitral valve repair with no insufficiency and an estimated LV ejection fraction of 60%. There is an artificial cord noted to the posterior mitral valve leaflet. The patient is on no cardiac medications and has had no complaints of shortness of breath chest pain or palpitations. EKG tracings in the past have demonstrated sinus rhythm with voltage criteria for left ventricular hypertrophy. His blood pressure today is well within normal range in the absence of treatment. The patient is doing cardio workouts with treadmill 25 minutes elliptical 30 minutes 3 times per week and was encouraged to proceed with this program. Prior to his operative procedure the patient had a CT angiogram of the coronary arteries which demonstrated no significant disease with perhaps a mild small calcific plaque in the proximal LAD causing a 30% stenosis. His CT coronary calcium score was only 26. As such he did not have a cardiac catheterization prior to the operative procedure. The patient will return in 6 months for routine follow-up visit.  Patient is doing well from cardiac standpoint.  Echocardiogram performed today 03/2024 demonstrates a mildly reduced LV ejection fraction 45% with abnormal septal motion due to prior thoracotomy.  He does have mild left atrial enlargement.  The mitral valve repair appears to be intact without artificial chordae tendineae to the  posterior mitral leaflet.  There is only a trace amount of mitral valve regurgitation.     2. Low-grade coronary artery disease. The patient had a preoperative CT coronary calcium score of only 26 on 8/11/2020 with CT angiogram of the coronary arteries showing minimal disease with a less than or equal to 30% plaque in the proximal LAD.      3.  Hyperlipidemia.  Given the patient's elevated CT coronary calcium score he had been started on rosuvastatin 5 mg daily.  Lab work 12/7/2023 includes cholesterol 149 LDL 80 HDL 54 triglycerides 69.  Will increase dose of rosuvastatin from 5 to 10 mg daily.     4.  Type 2 diabetes.  This patient is glycohemoglobin has been gradually rising over the past several years and was 7.2% on 12/7/2023.  The patient has been started on metformin 500 mg daily.  Additional lab work from 12/7/2023 included a normal SMA panel except glucose 180 and potassium of 5.8.  The potassium was repeated at 4.2.  Vitamin B12 605 folate 23.7 serum iron 135 normal CBC.       Dorcas Luther, APRN-CNP

## 2025-02-01 ENCOUNTER — APPOINTMENT (OUTPATIENT)
Dept: OTOLARYNGOLOGY | Facility: CLINIC | Age: 60
End: 2025-02-01
Payer: COMMERCIAL

## 2025-02-22 ENCOUNTER — APPOINTMENT (OUTPATIENT)
Dept: OTOLARYNGOLOGY | Facility: CLINIC | Age: 60
End: 2025-02-22
Payer: COMMERCIAL

## 2025-02-22 VITALS — WEIGHT: 162 LBS | HEIGHT: 68 IN | TEMPERATURE: 97 F | BODY MASS INDEX: 24.55 KG/M2

## 2025-02-22 DIAGNOSIS — H61.23 BILATERAL IMPACTED CERUMEN: ICD-10-CM

## 2025-02-22 DIAGNOSIS — H60.543 ECZEMA OF EXTERNAL EAR, BILATERAL: Primary | ICD-10-CM

## 2025-02-22 PROCEDURE — 3008F BODY MASS INDEX DOCD: CPT | Performed by: OTOLARYNGOLOGY

## 2025-02-22 PROCEDURE — 99212 OFFICE O/P EST SF 10 MIN: CPT | Performed by: OTOLARYNGOLOGY

## 2025-02-22 PROCEDURE — 1036F TOBACCO NON-USER: CPT | Performed by: OTOLARYNGOLOGY

## 2025-02-22 NOTE — PROGRESS NOTES
"Subjective   Patient ID: Sergio Hancock \"ALBERT\" is a 59 y.o. male  HPI  Patient presents for chronic eczema of the external ear canals and recurrent cerumen impaction.  He continues to use the DermOtic drops.  He is complaining of congestion in his ears again.    Review of Systems    Objective   Physical Exam  There is cerumen impaction bilaterally and this was cleared using speculum and curettes.  The tympanic membranes are clear and mobile.  There is dry flaking of the skin in the ear canals consistent with eczema and this was also debrided.    Procedures    Assessment/Plan   Diagnoses and all orders for this visit:  Eczema of external ear, bilateral (Primary)  Bilateral impacted cerumen       Chronic history of severe recurrent cerumen impaction with chronic severe eczema of the ear canals requiring periodic cleaning and debridement.  He will follow-up as needed.  "

## 2025-03-04 ENCOUNTER — APPOINTMENT (OUTPATIENT)
Dept: CARDIOLOGY | Facility: CLINIC | Age: 60
End: 2025-03-04
Payer: COMMERCIAL

## 2025-03-13 DIAGNOSIS — E78.00 ELEVATED LDL CHOLESTEROL LEVEL: ICD-10-CM

## 2025-03-13 RX ORDER — ROSUVASTATIN CALCIUM 10 MG/1
10 TABLET, COATED ORAL DAILY
Qty: 90 TABLET | Refills: 3 | Status: SHIPPED | OUTPATIENT
Start: 2025-03-13

## 2025-03-18 ENCOUNTER — APPOINTMENT (OUTPATIENT)
Dept: OPHTHALMOLOGY | Facility: CLINIC | Age: 60
End: 2025-03-18
Payer: COMMERCIAL

## 2025-03-18 DIAGNOSIS — H52.223 REGULAR ASTIGMATISM OF BOTH EYES WITH PRESBYOPIA: Primary | ICD-10-CM

## 2025-03-18 DIAGNOSIS — E11.9 TYPE 2 DIABETES MELLITUS WITHOUT RETINOPATHY (MULTI): ICD-10-CM

## 2025-03-18 DIAGNOSIS — H25.13 AGE-RELATED NUCLEAR CATARACT, BILATERAL: ICD-10-CM

## 2025-03-18 DIAGNOSIS — H52.4 REGULAR ASTIGMATISM OF BOTH EYES WITH PRESBYOPIA: Primary | ICD-10-CM

## 2025-03-18 PROCEDURE — 92015 DETERMINE REFRACTIVE STATE: CPT | Performed by: STUDENT IN AN ORGANIZED HEALTH CARE EDUCATION/TRAINING PROGRAM

## 2025-03-18 PROCEDURE — 92014 COMPRE OPH EXAM EST PT 1/>: CPT | Performed by: STUDENT IN AN ORGANIZED HEALTH CARE EDUCATION/TRAINING PROGRAM

## 2025-03-18 ASSESSMENT — CUP TO DISC RATIO
OS_RATIO: .3
OD_RATIO: .3

## 2025-03-18 ASSESSMENT — VISUAL ACUITY
OS_SC: 20/25
OD_SC+: -1
METHOD: SNELLEN - LINEAR
OD_SC: 20/25

## 2025-03-18 ASSESSMENT — SLIT LAMP EXAM - LIDS
COMMENTS: GOOD POSITION
COMMENTS: GOOD POSITION

## 2025-03-18 ASSESSMENT — REFRACTION_MANIFEST
OS_SPHERE: -0.75
OS_AXIS: 070
OD_AXIS: 095
OD_CYLINDER: +0.75
OS_CYLINDER: +0.75
OD_ADD: +2.00
OS_ADD: +2.00
OD_SPHERE: -0.50

## 2025-03-18 ASSESSMENT — TONOMETRY
OD_IOP_MMHG: 14
IOP_METHOD: GOLDMANN APPLANATION
OS_IOP_MMHG: 14

## 2025-03-18 ASSESSMENT — CONF VISUAL FIELD
OD_INFERIOR_NASAL_RESTRICTION: 0
OD_SUPERIOR_TEMPORAL_RESTRICTION: 0
OS_INFERIOR_TEMPORAL_RESTRICTION: 0
OD_INFERIOR_TEMPORAL_RESTRICTION: 0
OD_SUPERIOR_NASAL_RESTRICTION: 0
OS_SUPERIOR_TEMPORAL_RESTRICTION: 0
OS_INFERIOR_NASAL_RESTRICTION: 0
OD_NORMAL: 1
OS_SUPERIOR_NASAL_RESTRICTION: 0
OS_NORMAL: 1

## 2025-03-18 ASSESSMENT — REFRACTION_WEARINGRX
OD_CYLINDER: +0.75
OD_ADD: +2.00
OS_AXIS: 075
OD_AXIS: 100
OD_SPHERE: -0.50
OS_ADD: +2.00
OS_SPHERE: -0.75
OS_CYLINDER: +0.75

## 2025-03-18 ASSESSMENT — EXTERNAL EXAM - LEFT EYE: OS_EXAM: NORMAL

## 2025-03-18 ASSESSMENT — EXTERNAL EXAM - RIGHT EYE: OD_EXAM: NORMAL

## 2025-03-18 NOTE — PROGRESS NOTES
Assessment/Plan   Diagnoses and all orders for this visit:  Regular astigmatism of both eyes with presbyopia  -New spec rx released today per patient request. Ocular health wnl for age OU. Monitor 1 year or sooner prn. Refraction billed today. BCVA 20/20 OD+OS  -patient usually wears glasses only for reading   Age-related nuclear cataract, bilateral  -mild non visually significant. Pt ed. Monitor  Type 2 diabetes mellitus without retinopathy (CMS/HCC)  -no retinopathy observed on exam today od/os, pt ed to continue good BGlc, blood pressure and lipid control, rtc with any changes in vision, otherwise monitor 1 year    RTC 1 year for annual with DFE and MRX

## 2025-03-19 PROBLEM — H35.9 RETINAL DISORDER: Status: RESOLVED | Noted: 2019-05-01 | Resolved: 2025-03-19

## 2025-03-19 PROBLEM — H25.10 NUCLEAR SENILE CATARACT: Status: RESOLVED | Noted: 2019-05-01 | Resolved: 2025-03-19

## 2025-03-19 ASSESSMENT — ENCOUNTER SYMPTOMS
NEUROLOGICAL NEGATIVE: 0
ENDOCRINE NEGATIVE: 0
GASTROINTESTINAL NEGATIVE: 0
EYES NEGATIVE: 0
CARDIOVASCULAR NEGATIVE: 0
RESPIRATORY NEGATIVE: 0
HEMATOLOGIC/LYMPHATIC NEGATIVE: 0
MUSCULOSKELETAL NEGATIVE: 0
CONSTITUTIONAL NEGATIVE: 0
PSYCHIATRIC NEGATIVE: 0
ALLERGIC/IMMUNOLOGIC NEGATIVE: 0

## 2025-03-19 ASSESSMENT — CONF VISUAL FIELD: METHOD: COUNTING FINGERS

## 2025-03-25 ENCOUNTER — OFFICE VISIT (OUTPATIENT)
Dept: CARDIOLOGY | Facility: CLINIC | Age: 60
End: 2025-03-25
Payer: COMMERCIAL

## 2025-03-25 VITALS
OXYGEN SATURATION: 96 % | HEIGHT: 68 IN | SYSTOLIC BLOOD PRESSURE: 124 MMHG | BODY MASS INDEX: 25.23 KG/M2 | WEIGHT: 166.5 LBS | HEART RATE: 74 BPM | DIASTOLIC BLOOD PRESSURE: 78 MMHG

## 2025-03-25 DIAGNOSIS — E78.00 ELEVATED LDL CHOLESTEROL LEVEL: ICD-10-CM

## 2025-03-25 PROCEDURE — 99213 OFFICE O/P EST LOW 20 MIN: CPT | Performed by: INTERNAL MEDICINE

## 2025-03-25 PROCEDURE — 3074F SYST BP LT 130 MM HG: CPT | Performed by: INTERNAL MEDICINE

## 2025-03-25 PROCEDURE — 3078F DIAST BP <80 MM HG: CPT | Performed by: INTERNAL MEDICINE

## 2025-03-25 PROCEDURE — 3008F BODY MASS INDEX DOCD: CPT | Performed by: INTERNAL MEDICINE

## 2025-03-25 RX ORDER — ROSUVASTATIN CALCIUM 10 MG/1
10 TABLET, COATED ORAL DAILY
Qty: 90 TABLET | Refills: 3 | Status: SHIPPED | OUTPATIENT
Start: 2025-03-25

## 2025-03-25 ASSESSMENT — ENCOUNTER SYMPTOMS
GASTROINTESTINAL NEGATIVE: 1
CONSTITUTIONAL NEGATIVE: 1
CARDIOVASCULAR NEGATIVE: 1
NEUROLOGICAL NEGATIVE: 1
LOSS OF SENSATION IN FEET: 0
RESPIRATORY NEGATIVE: 1
DEPRESSION: 0
OCCASIONAL FEELINGS OF UNSTEADINESS: 0
MUSCULOSKELETAL NEGATIVE: 1

## 2025-03-25 ASSESSMENT — PAIN SCALES - GENERAL: PAINLEVEL_OUTOF10: 0-NO PAIN

## 2025-03-25 NOTE — PROGRESS NOTES
"Chief Complaint:   No chief complaint on file.    History Of Present Illness:    .Mr Card returns in follow up.  Denies chest pain, sob, palpitations or pedal edema.         Last Recorded Vitals:  Blood pressure 137/88, pulse 81, height 1.727 m (5' 8\"), weight 75.5 kg (166 lb 8 oz), SpO2 96%.     Past Medical History:  Past Medical History:   Diagnosis Date    Chlamydial infection, unspecified     Disease due to chlamydiae    Elevated blood-pressure reading, without diagnosis of hypertension 09/09/2020    Elevated blood pressure reading    Hemorrhage of anus and rectum     Hemorrhage of rectum and anus    Other conditions influencing health status     Verrucous Carcinoma Of The Anal Canal    Personal history of other diseases of the circulatory system 11/30/2022    History of mitral valve insufficiency    Personal history of other diseases of the circulatory system 12/03/2020    History of mitral valve prolapse    Personal history of other diseases of the circulatory system 11/30/2022    History of mitral valve disease    Personal history of other specified conditions 09/09/2020    History of abnormal weight loss    Personal history of other specified conditions 11/17/2020    History of headache        Past Surgical History:  Past Surgical History:   Procedure Laterality Date    OTHER SURGICAL HISTORY  09/24/2013    Abdominoperineal Resection    OTHER SURGICAL HISTORY  09/11/2020    Anal Surgery    OTHER SURGICAL HISTORY  10/23/2020    Mitral valve repair       Social History:  Social History     Socioeconomic History    Marital status: Single   Tobacco Use    Smoking status: Never    Smokeless tobacco: Never   Substance and Sexual Activity    Alcohol use: Never    Drug use: Never       Family History:  No family history on file.      Allergies:  Penicillins    Outpatient Medications:  Current Outpatient Medications   Medication Sig Dispense Refill    biotin 2,500 mcg capsule Take 1 capsule (2.5 mg) by mouth once " daily.      cholecalciferol (Vitamin D-3) 50 mcg (2,000 unit) capsule Take 1 capsule (50 mcg) by mouth once daily.      fluocinolone (DermOtic) 0.01 % ear drops Instill 5 drops in the affected ear once a day as needed for itching (Patient not taking: Reported on 3/25/2025) 20 mL 3    metFORMIN XR (Glucophage-XR) 500 mg 24 hr tablet Take 1 tablet (500 mg) by mouth once daily in the evening. Take with meals. Do not crush, chew, or split. (Patient not taking: Reported on 3/25/2025) 100 tablet 0    rosuvastatin (Crestor) 10 mg tablet TAKE 1 TABLET BY MOUTH EVERY DAY (Patient not taking: Reported on 3/25/2025) 90 tablet 3     No current facility-administered medications for this visit.        Physical Exam:  Cardiovascular:      PMI at left midclavicular line. Normal rate. Regular rhythm. Normal S1. Normal S2.       Murmurs: There is no murmur.      No gallop.  No click. No rub.   Pulses:     Intact distal pulses.   Edema:     Peripheral edema absent.       ROS:  Review of Systems   Constitutional: Negative.   Cardiovascular: Negative.    Respiratory: Negative.     Skin: Negative.    Musculoskeletal: Negative.    Gastrointestinal: Negative.    Genitourinary: Negative.    Neurological: Negative.           Last Labs:  CBC -  Lab Results   Component Value Date    WBC 8.4 12/07/2023    HGB 16.4 12/07/2023    HCT 48.4 12/07/2023     12/07/2023     12/07/2023       CMP -  Lab Results   Component Value Date    CALCIUM 9.5 12/07/2023    PHOS 2.6 09/03/2020    PROT 7.7 12/07/2023    ALBUMIN 4.6 12/07/2023    AST 17 12/07/2023    ALT 25 12/07/2023    ALKPHOS 76 12/07/2023    BILITOT 0.8 12/07/2023       LIPID PANEL -   Lab Results   Component Value Date    CHOL 149 12/07/2023    TRIG 69 12/07/2023    HDL 54.9 12/07/2023    CHHDL 2.7 12/07/2023    LDLF 100 (H) 12/08/2022    VLDL 14 12/07/2023    NHDL 94 12/07/2023       RENAL FUNCTION PANEL -   Lab Results   Component Value Date    GLUCOSE 180 (H) 12/07/2023      12/07/2023    K 4.2 12/13/2023     12/07/2023    CO2 27 12/07/2023    ANIONGAP 15 12/07/2023    BUN 17 12/07/2023    CREATININE 1.02 12/07/2023    GFRMALE 89 12/08/2022    CALCIUM 9.5 12/07/2023    PHOS 2.6 09/03/2020    ALBUMIN 4.6 12/07/2023        Lab Results   Component Value Date    HGBA1C 6.5 05/28/2024         Assessment/Plan   Problem List Items Addressed This Visit    None      1. Status post radical mitral valve repair with #33 ATS ring plus posterior leaflet artificial chordae tendineae 8/26/2020 [facility]. The patient is doing very well now approximately 1-1/2 years since his operative procedure. Echocardiogram performed today demonstrates a normal mitral valve repair with no insufficiency and an estimated LV ejection fraction of 60%. There is an artificial cord noted to the posterior mitral valve leaflet. The patient is on no cardiac medications and has had no complaints of shortness of breath chest pain or palpitations. EKG tracings in the past have demonstrated sinus rhythm with voltage criteria for left ventricular hypertrophy. His blood pressure today is well within normal range in the absence of treatment. The patient is doing cardio workouts with treadmill 25 minutes elliptical 30 minutes 3 times per week and was encouraged to proceed with this program. Prior to his operative procedure the patient had a CT angiogram of the coronary arteries which demonstrated no significant disease with perhaps a mild small calcific plaque in the proximal LAD causing a 30% stenosis. His CT coronary calcium score was only 26. As such he did not have a cardiac catheterization prior to the operative procedure. The patient will return in 6 months for routine follow-up visit.  Patient is doing well from cardiac standpoint.  Echocardiogram performed  03/12/2024 demonstrates a mildly reduced LV ejection fraction 45% with abnormal septal motion due to prior thoracotomy.  He does have mild left atrial  enlargement.  The mitral valve repair appears to be intact without artificial chordae tendineae to the posterior mitral leaflet.  There is only a trace amount of mitral valve regurgitation.  Patient doing well and will be seen in 6 months for follow-up.     2. Low-grade coronary artery disease. The patient had a preoperative CT coronary calcium score of only 26 on 8/11/2020 with CT angiogram of the coronary arteries showing minimal disease with a less than or equal to 30% plaque in the proximal LAD.      3.  Hyperlipidemia.  Given the patient's elevated CT coronary calcium score he had been started on rosuvastatin 5 mg daily.  Lab work 12/7/2023 includes cholesterol 149 LDL 80 HDL 54 triglycerides 69.  Will increase dose of rosuvastatin from 5 to 10 mg daily.  The patient will remain on the rosuvastatin 10 mg daily.     4.  Type 2 diabetes.  This patient is glycohemoglobin has been gradually rising over the past several years and was 7.2% on 12/7/2023.  The patient has been started on metformin 500 mg daily.  Additional lab work from 12/7/2023 included a normal SMA panel except glucose 180 and potassium of 5.8.  The potassium was repeated at 4.2.  Vitamin B12 605 folate 23.7 serum iron 135 normal CBC.  Lab work from 5/28/2024 included a normal SMA panel with glucose 135 with a glycohemoglobin of 6.5%.  The patient presently is only on metformin 500 mg every afternoon which we will continue unchanged as long as the glycohemoglobin remains less than 7.0%.

## 2025-07-31 ENCOUNTER — TELEMEDICINE (OUTPATIENT)
Dept: PRIMARY CARE | Facility: CLINIC | Age: 60
End: 2025-07-31
Payer: COMMERCIAL

## 2025-07-31 VITALS
TEMPERATURE: 97.5 F | SYSTOLIC BLOOD PRESSURE: 134 MMHG | BODY MASS INDEX: 24.63 KG/M2 | DIASTOLIC BLOOD PRESSURE: 78 MMHG | WEIGHT: 162 LBS

## 2025-07-31 DIAGNOSIS — J40 BRONCHITIS: Primary | ICD-10-CM

## 2025-07-31 PROCEDURE — 3078F DIAST BP <80 MM HG: CPT | Performed by: PEDIATRICS

## 2025-07-31 PROCEDURE — 3075F SYST BP GE 130 - 139MM HG: CPT | Performed by: PEDIATRICS

## 2025-07-31 PROCEDURE — 99212 OFFICE O/P EST SF 10 MIN: CPT | Performed by: PEDIATRICS

## 2025-07-31 NOTE — PROGRESS NOTES
Subjective   Patient ID: ALBERT Hancock is a 59 y.o. male who presents for cough which started a week ago    HPI   No fever; no shortness of breath; feels off balance;  Nose has been running; spitting out yellow phlegm; Cough is better than before. Went to urgent care-->Covid test negative. Was given Doxycycline. Lungs clear per urgent care   Review of Systems    Objective   /78   Temp 36.4 °C (97.5 °F)   Wt 73.5 kg (162 lb)   BMI 24.63 kg/m²     Physical Exam  Patient in NAD  Assessment/Plan   Problem List Items Addressed This Visit           ICD-10-CM    Bronchitis - Primary J40

## 2025-08-04 ENCOUNTER — TELEPHONE (OUTPATIENT)
Dept: PRIMARY CARE | Facility: CLINIC | Age: 60
End: 2025-08-04
Payer: COMMERCIAL

## 2025-08-04 NOTE — TELEPHONE ENCOUNTER
Patient is reaching out to see if he can come anytime this month after 345, he stated he doesn't get off until 4pm.

## 2025-08-07 DIAGNOSIS — H60.543 ECZEMA OF EXTERNAL EAR, BILATERAL: ICD-10-CM

## 2025-08-07 RX ORDER — FLUOCINOLONE ACETONIDE 0.11 MG/ML
OIL AURICULAR (OTIC)
Qty: 20 ML | Refills: 3 | Status: SHIPPED | OUTPATIENT
Start: 2025-08-07

## 2025-08-19 ENCOUNTER — APPOINTMENT (OUTPATIENT)
Dept: PRIMARY CARE | Facility: CLINIC | Age: 60
End: 2025-08-19
Payer: COMMERCIAL

## 2025-08-19 VITALS
BODY MASS INDEX: 25.39 KG/M2 | WEIGHT: 167 LBS | DIASTOLIC BLOOD PRESSURE: 72 MMHG | OXYGEN SATURATION: 97 % | SYSTOLIC BLOOD PRESSURE: 124 MMHG | HEART RATE: 70 BPM

## 2025-08-19 DIAGNOSIS — K64.4 EXTERNAL HEMORRHOIDS: ICD-10-CM

## 2025-08-19 DIAGNOSIS — H25.13 AGE-RELATED NUCLEAR CATARACT, BILATERAL: ICD-10-CM

## 2025-08-19 DIAGNOSIS — E78.00 ELEVATED LDL CHOLESTEROL LEVEL: ICD-10-CM

## 2025-08-19 DIAGNOSIS — Z11.3 SCREENING FOR STD (SEXUALLY TRANSMITTED DISEASE): ICD-10-CM

## 2025-08-19 DIAGNOSIS — I25.10 CORONARY ARTERY DISEASE INVOLVING NATIVE CORONARY ARTERY OF NATIVE HEART WITHOUT ANGINA PECTORIS: ICD-10-CM

## 2025-08-19 DIAGNOSIS — H60.549 ECZEMA OF EXTERNAL AUDITORY CANAL: ICD-10-CM

## 2025-08-19 DIAGNOSIS — Z12.5 SCREENING PSA (PROSTATE SPECIFIC ANTIGEN): ICD-10-CM

## 2025-08-19 DIAGNOSIS — R51.9 INTRACTABLE HEADACHE, UNSPECIFIED CHRONICITY PATTERN, UNSPECIFIED HEADACHE TYPE: ICD-10-CM

## 2025-08-19 DIAGNOSIS — E61.1 IRON DEFICIENCY: ICD-10-CM

## 2025-08-19 DIAGNOSIS — E55.9 VITAMIN D DEFICIENCY: ICD-10-CM

## 2025-08-19 DIAGNOSIS — Z00.00 WELL ADULT EXAM: Primary | ICD-10-CM

## 2025-08-19 DIAGNOSIS — E11.9 TYPE 2 DIABETES MELLITUS WITHOUT RETINOPATHY (MULTI): ICD-10-CM

## 2025-08-19 DIAGNOSIS — B35.3 TINEA PEDIS OF BOTH FEET: ICD-10-CM

## 2025-08-19 PROBLEM — D75.89 MACROCYTOSIS: Status: RESOLVED | Noted: 2023-11-17 | Resolved: 2025-08-19

## 2025-08-19 PROCEDURE — 90715 TDAP VACCINE 7 YRS/> IM: CPT | Performed by: PEDIATRICS

## 2025-08-19 PROCEDURE — 3078F DIAST BP <80 MM HG: CPT | Performed by: PEDIATRICS

## 2025-08-19 PROCEDURE — 3074F SYST BP LT 130 MM HG: CPT | Performed by: PEDIATRICS

## 2025-08-19 PROCEDURE — 90471 IMMUNIZATION ADMIN: CPT | Performed by: PEDIATRICS

## 2025-08-19 PROCEDURE — 99396 PREV VISIT EST AGE 40-64: CPT | Performed by: PEDIATRICS

## 2025-08-23 ENCOUNTER — APPOINTMENT (OUTPATIENT)
Dept: OTOLARYNGOLOGY | Facility: CLINIC | Age: 60
End: 2025-08-23
Payer: COMMERCIAL

## 2025-09-06 ENCOUNTER — APPOINTMENT (OUTPATIENT)
Dept: OTOLARYNGOLOGY | Facility: CLINIC | Age: 60
End: 2025-09-06
Payer: COMMERCIAL

## 2025-09-13 ENCOUNTER — APPOINTMENT (OUTPATIENT)
Dept: OTOLARYNGOLOGY | Facility: CLINIC | Age: 60
End: 2025-09-13
Payer: COMMERCIAL

## 2026-02-24 ENCOUNTER — APPOINTMENT (OUTPATIENT)
Dept: PRIMARY CARE | Facility: CLINIC | Age: 61
End: 2026-02-24
Payer: COMMERCIAL

## 2026-03-24 ENCOUNTER — APPOINTMENT (OUTPATIENT)
Dept: OPHTHALMOLOGY | Facility: CLINIC | Age: 61
End: 2026-03-24
Payer: COMMERCIAL